# Patient Record
Sex: FEMALE | Race: WHITE | NOT HISPANIC OR LATINO | URBAN - METROPOLITAN AREA
[De-identification: names, ages, dates, MRNs, and addresses within clinical notes are randomized per-mention and may not be internally consistent; named-entity substitution may affect disease eponyms.]

---

## 2017-05-16 ENCOUNTER — OUTPATIENT (OUTPATIENT)
Dept: OUTPATIENT SERVICES | Facility: HOSPITAL | Age: 62
LOS: 1 days | Discharge: HOME | End: 2017-05-16

## 2017-05-16 ENCOUNTER — APPOINTMENT (OUTPATIENT)
Dept: HEMATOLOGY ONCOLOGY | Facility: CLINIC | Age: 62
End: 2017-05-16

## 2017-05-16 VITALS
TEMPERATURE: 96.2 F | HEIGHT: 65 IN | WEIGHT: 119 LBS | DIASTOLIC BLOOD PRESSURE: 59 MMHG | HEART RATE: 77 BPM | SYSTOLIC BLOOD PRESSURE: 116 MMHG | BODY MASS INDEX: 19.83 KG/M2 | RESPIRATION RATE: 14 BRPM

## 2017-06-28 DIAGNOSIS — C85.93 NON-HODGKIN LYMPHOMA, UNSPECIFIED, INTRA-ABDOMINAL LYMPH NODES: ICD-10-CM

## 2018-05-15 ENCOUNTER — APPOINTMENT (OUTPATIENT)
Dept: HEMATOLOGY ONCOLOGY | Facility: CLINIC | Age: 63
End: 2018-05-15

## 2018-05-15 ENCOUNTER — OUTPATIENT (OUTPATIENT)
Dept: OUTPATIENT SERVICES | Facility: HOSPITAL | Age: 63
LOS: 1 days | Discharge: HOME | End: 2018-05-15

## 2018-05-15 VITALS
HEART RATE: 80 BPM | SYSTOLIC BLOOD PRESSURE: 111 MMHG | WEIGHT: 120 LBS | BODY MASS INDEX: 19.99 KG/M2 | DIASTOLIC BLOOD PRESSURE: 71 MMHG | HEIGHT: 65 IN | TEMPERATURE: 97.1 F

## 2018-05-21 DIAGNOSIS — C85.93 NON-HODGKIN LYMPHOMA, UNSPECIFIED, INTRA-ABDOMINAL LYMPH NODES: ICD-10-CM

## 2019-05-14 ENCOUNTER — OUTPATIENT (OUTPATIENT)
Dept: OUTPATIENT SERVICES | Facility: HOSPITAL | Age: 64
LOS: 1 days | Discharge: HOME | End: 2019-05-14

## 2019-05-14 ENCOUNTER — APPOINTMENT (OUTPATIENT)
Dept: HEMATOLOGY ONCOLOGY | Facility: CLINIC | Age: 64
End: 2019-05-14

## 2019-05-14 VITALS
RESPIRATION RATE: 14 BRPM | HEIGHT: 65 IN | TEMPERATURE: 96.4 F | BODY MASS INDEX: 19.49 KG/M2 | WEIGHT: 117 LBS | DIASTOLIC BLOOD PRESSURE: 81 MMHG | HEART RATE: 84 BPM | SYSTOLIC BLOOD PRESSURE: 137 MMHG

## 2019-05-14 NOTE — HISTORY OF PRESENT ILLNESS
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  She was treated with RCHOP in 2010.\par \par She is up to date with colonoscopy 2016 and had a mammogram 6/1/18 and Pap smear was done last July 2018 [de-identified] : Patient offers no new complaints today.\par She states she had a rough year since her  .\par No fever, wt loss, \par Appetite is fair.\par She still exercises regularly

## 2019-05-14 NOTE — REASON FOR VISIT
[Follow-Up Visit] : a follow-up [FreeTextEntry2] : history of stage IV diffuse large B-cell lymphoma

## 2019-05-14 NOTE — ASSESSMENT
[FreeTextEntry1] : H/o DLBCL without any evidence of recurrence. \par \par She will follow here in one year\par Labs discussed.\par Pt advised about signs of recurrence.

## 2019-05-24 DIAGNOSIS — C85.93 NON-HODGKIN LYMPHOMA, UNSPECIFIED, INTRA-ABDOMINAL LYMPH NODES: ICD-10-CM

## 2020-06-16 ENCOUNTER — APPOINTMENT (OUTPATIENT)
Dept: HEMATOLOGY ONCOLOGY | Facility: CLINIC | Age: 65
End: 2020-06-16
Payer: MEDICARE

## 2020-06-16 ENCOUNTER — LABORATORY RESULT (OUTPATIENT)
Age: 65
End: 2020-06-16

## 2020-06-16 ENCOUNTER — OUTPATIENT (OUTPATIENT)
Dept: OUTPATIENT SERVICES | Facility: HOSPITAL | Age: 65
LOS: 1 days | Discharge: HOME | End: 2020-06-16

## 2020-06-16 VITALS
TEMPERATURE: 96.8 F | HEART RATE: 87 BPM | SYSTOLIC BLOOD PRESSURE: 133 MMHG | HEIGHT: 65 IN | WEIGHT: 118 LBS | DIASTOLIC BLOOD PRESSURE: 67 MMHG | BODY MASS INDEX: 19.66 KG/M2

## 2020-06-16 LAB
ALBUMIN SERPL ELPH-MCNC: 4.8 G/DL
ALP BLD-CCNC: 75 U/L
ALT SERPL-CCNC: 37 U/L
ANION GAP SERPL CALC-SCNC: 14 MMOL/L
AST SERPL-CCNC: 32 U/L
BILIRUB SERPL-MCNC: 0.9 MG/DL
BUN SERPL-MCNC: 10 MG/DL
CALCIUM SERPL-MCNC: 9.9 MG/DL
CHLORIDE SERPL-SCNC: 103 MMOL/L
CO2 SERPL-SCNC: 26 MMOL/L
CREAT SERPL-MCNC: 0.8 MG/DL
GLUCOSE SERPL-MCNC: 83 MG/DL
HCT VFR BLD CALC: 43.9 %
HGB BLD-MCNC: 14.4 G/DL
LDH SERPL-CCNC: 258
MCHC RBC-ENTMCNC: 30.3 PG
MCHC RBC-ENTMCNC: 32.8 G/DL
MCV RBC AUTO: 92.2 FL
PLATELET # BLD AUTO: 235 K/UL
PMV BLD: 9.6 FL
POTASSIUM SERPL-SCNC: 4.7 MMOL/L
PROT SERPL-MCNC: 7.7 G/DL
RBC # BLD: 4.76 M/UL
RBC # FLD: 12.7 %
SODIUM SERPL-SCNC: 143 MMOL/L
WBC # FLD AUTO: 5.44 K/UL

## 2020-06-16 PROCEDURE — 99213 OFFICE O/P EST LOW 20 MIN: CPT

## 2020-06-16 NOTE — REVIEW OF SYSTEMS
[Negative] : Allergic/Immunologic [FreeTextEntry2] : Last mammogram 6/2019- next due 6/2020, coloscopy 4 years ago.

## 2020-06-16 NOTE — HISTORY OF PRESENT ILLNESS
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  She was treated with RCHOP in 2010.\par \par She is up to date with colonoscopy 2016 and had a mammogram 6/1/18 and Pap smear was done last July 2018 [de-identified] : Patient offers no new complaints today.\par She states she had a rough year since her  .\par No fever, wt loss, \par Appetite is fair.\par She still exercises regularly\par \par 2020\par EDUARDA PUGA a 65 year F is here today for annual follow up of stage IV stage diffuse large B cell lymphoma.\par Pt offers no new complaints. \par Denies fever, chills, SOB, weight loss, night sweats.\par

## 2020-06-16 NOTE — ASSESSMENT
[FreeTextEntry1] : H/o DLBCL without any evidence of recurrence. \par \par She will follow here in one year.\par Note provided for patient to give to condo board to restrict contractors or maintenance workers in her home at this time in light of COVID-19 and her immunocompromised status.  \par Labs ordered CBC, CMP, LDH\par F/up with PCP\par Pt advised about signs of recurrence.\par \par Case was seen and discussed with Dr. West who agreed with the assessment and plan.\par

## 2020-06-25 DIAGNOSIS — C85.10 UNSPECIFIED B-CELL LYMPHOMA, UNSPECIFIED SITE: ICD-10-CM

## 2021-07-14 ENCOUNTER — OUTPATIENT (OUTPATIENT)
Dept: OUTPATIENT SERVICES | Facility: HOSPITAL | Age: 66
LOS: 1 days | Discharge: HOME | End: 2021-07-14

## 2021-07-14 ENCOUNTER — LABORATORY RESULT (OUTPATIENT)
Age: 66
End: 2021-07-14

## 2021-07-14 ENCOUNTER — APPOINTMENT (OUTPATIENT)
Dept: HEMATOLOGY ONCOLOGY | Facility: CLINIC | Age: 66
End: 2021-07-14
Payer: MEDICARE

## 2021-07-14 LAB
ALBUMIN SERPL ELPH-MCNC: 4.6 G/DL
ALP BLD-CCNC: 80 U/L
ALT SERPL-CCNC: 43 U/L
ANION GAP SERPL CALC-SCNC: 13 MMOL/L
AST SERPL-CCNC: 43 U/L
BILIRUB SERPL-MCNC: 0.8 MG/DL
BUN SERPL-MCNC: 14 MG/DL
CALCIUM SERPL-MCNC: 9.5 MG/DL
CHLORIDE SERPL-SCNC: 104 MMOL/L
CO2 SERPL-SCNC: 23 MMOL/L
CREAT SERPL-MCNC: 0.8 MG/DL
GLUCOSE SERPL-MCNC: 72 MG/DL
HCT VFR BLD CALC: 43 %
HGB BLD-MCNC: 14 G/DL
LDH SERPL-CCNC: 332
MCHC RBC-ENTMCNC: 30.1 PG
MCHC RBC-ENTMCNC: 32.6 G/DL
MCV RBC AUTO: 92.5 FL
PLATELET # BLD AUTO: 243 K/UL
PMV BLD: 10.5 FL
POTASSIUM SERPL-SCNC: 4.7 MMOL/L
PROT SERPL-MCNC: 7.2 G/DL
RBC # BLD: 4.65 M/UL
RBC # FLD: 13.2 %
SODIUM SERPL-SCNC: 140 MMOL/L
WBC # FLD AUTO: 5.96 K/UL

## 2021-07-14 PROCEDURE — 99213 OFFICE O/P EST LOW 20 MIN: CPT

## 2021-07-14 NOTE — HISTORY OF PRESENT ILLNESS
[de-identified] : Patient offers no new complaints today.\par She states she had a rough year since her  .\par No fever, wt loss, \par Appetite is fair.\par She still exercises regularly\par \par 2020\par EDUARDA PUGA a 65 year F is here today for annual follow up of stage IV stage diffuse large B cell lymphoma.\par Pt offers no new complaints. \par Denies fever, chills, SOB, weight loss, night sweats.\par \par 2021\par Patient is here to follow for lymphoma\par She feels well today, denies any fever, chills, night sweats, weight loss or lymphadenopathy\par She received COVID vaccine x 2\par She is UTD with her PCP, ammo and colonoscopy\par She has not been with her gyne for a few years now\par \par   [de-identified] : This is a 63 year old white female who is here for follow up for her history of stage IV diffuse large B-cell lymphoma diagnosed in May 2010.  She was treated with RCHOP in 2010.\par \par She is up to date with colonoscopy 2016 and had a mammogram 6/1/18 and Pap smear was done last July 2018

## 2021-07-14 NOTE — ASSESSMENT
[FreeTextEntry1] : H/o DLBCL without any evidence of recurrence. \par \par CBC on 7/12/2021 reviewed and normal. Results discussed with patient. \par Signs and symptoms of disease recurrence discussed.\par Will do CMP and LDH today. Will call patient with the results.\par F/up with PCP, gyne and GI as recommended\par \par RTC in 1 year\par \par Case was seen and discussed with Dr. West who agreed with the assessment and plan.\par

## 2021-07-14 NOTE — REVIEW OF SYSTEMS
[Negative] : Allergic/Immunologic [Joint Pain] : joint pain [FreeTextEntry9] : intermittent right knee pain

## 2021-07-15 DIAGNOSIS — C85.10 UNSPECIFIED B-CELL LYMPHOMA, UNSPECIFIED SITE: ICD-10-CM

## 2022-06-06 NOTE — END OF VISIT
[FreeTextEntry3] : I was physically present for the key portions of the evaluation and management service provided.  I agree with the history and physical, and plan which I have reviewed and edited where appropriate.\par 
no

## 2022-07-05 ENCOUNTER — OUTPATIENT (OUTPATIENT)
Dept: OUTPATIENT SERVICES | Facility: HOSPITAL | Age: 67
LOS: 1 days | Discharge: HOME | End: 2022-07-05

## 2022-07-05 ENCOUNTER — LABORATORY RESULT (OUTPATIENT)
Age: 67
End: 2022-07-05

## 2022-07-05 ENCOUNTER — APPOINTMENT (OUTPATIENT)
Dept: HEMATOLOGY ONCOLOGY | Facility: CLINIC | Age: 67
End: 2022-07-05

## 2022-07-05 VITALS
HEIGHT: 65 IN | TEMPERATURE: 98.2 F | HEART RATE: 84 BPM | BODY MASS INDEX: 19.66 KG/M2 | SYSTOLIC BLOOD PRESSURE: 121 MMHG | WEIGHT: 118 LBS | DIASTOLIC BLOOD PRESSURE: 80 MMHG

## 2022-07-05 LAB
HCT VFR BLD CALC: 43.2 %
HGB BLD-MCNC: 14.5 G/DL
MCHC RBC-ENTMCNC: 30.7 PG
MCHC RBC-ENTMCNC: 33.6 G/DL
MCV RBC AUTO: 91.3 FL
PLATELET # BLD AUTO: 302 K/UL
PMV BLD: 9.7 FL
RBC # BLD: 4.73 M/UL
RBC # FLD: 12.8 %
WBC # FLD AUTO: 5.46 K/UL

## 2022-07-05 PROCEDURE — 99213 OFFICE O/P EST LOW 20 MIN: CPT

## 2022-07-05 NOTE — ASSESSMENT
[FreeTextEntry1] : Patient is a 67 year old female with h/o DLBCL without any evidence of recurrence. \par \par CBC today is normal.\par \par RECOMMENDATION:\par Previous notes reviewed including relevant laboratory results and were communicated to the patient.\par \par Signs and symptoms of disease recurrence discussed.\par Will do CMP and LDH today. Will call patient with the results.\par Follow up with PCP as scheduled. Emphasized to see gyne and GI for health maintenance and screenings.\par \par RTC in 1 year\par \par \par

## 2022-07-05 NOTE — REVIEW OF SYSTEMS
[Negative] : Allergic/Immunologic [Joint Pain] : no joint pain [FreeTextEntry9] : intermittent low back pain when lifting heavy objects

## 2022-07-05 NOTE — HISTORY OF PRESENT ILLNESS
[de-identified] : Patient offers no new complaints today.\par She states she had a rough year since her  .\par No fever, wt loss, \par Appetite is fair.\par She still exercises regularly\par \par 2020\par EDUARDA PUGA a 65 year F is here today for annual follow up of stage IV stage diffuse large B cell lymphoma.\par Pt offers no new complaints. \par Denies fever, chills, SOB, weight loss, night sweats.\par \par 2021\par Patient is here to follow for lymphoma\par She feels well today, denies any fever, chills, night sweats, weight loss or lymphadenopathy\par She received COVID vaccine x 2\par She is UTD with her PCP, ammo and colonoscopy\par She has not been with her gyne for a few years now\par \par  2022\par Patient is here to follow for lymphoma.\par She feels well today, denies any fever, chills, night sweats, weight loss or lymphadenopathy.\par She is UTD with her PCP, does not see gyne x few years now and last colonoscopy was a few years ago and was benign as per patient.\par  [de-identified] : This is a 63 year old white female who is here for follow up for her history of stage IV diffuse large B-cell lymphoma diagnosed in May 2010.  She was treated with RCHOP in 2010.\par \par She is up to date with colonoscopy 2016 and had a mammogram 6/1/18 and Pap smear was done last July 2018

## 2022-07-06 LAB
ALBUMIN SERPL ELPH-MCNC: 4.6 G/DL
ALP BLD-CCNC: 71 U/L
ALT SERPL-CCNC: 46 U/L
ANION GAP SERPL CALC-SCNC: 15 MMOL/L
AST SERPL-CCNC: 45 U/L
BILIRUB SERPL-MCNC: 0.6 MG/DL
BUN SERPL-MCNC: 14 MG/DL
CALCIUM SERPL-MCNC: 9.7 MG/DL
CHLORIDE SERPL-SCNC: 103 MMOL/L
CO2 SERPL-SCNC: 25 MMOL/L
CREAT SERPL-MCNC: 0.9 MG/DL
EGFR: 70 ML/MIN/1.73M2
GLUCOSE SERPL-MCNC: 85 MG/DL
LDH SERPL-CCNC: 284
POTASSIUM SERPL-SCNC: 4.9 MMOL/L
PROT SERPL-MCNC: 7.3 G/DL
SODIUM SERPL-SCNC: 143 MMOL/L

## 2022-07-11 DIAGNOSIS — C85.10 UNSPECIFIED B-CELL LYMPHOMA, UNSPECIFIED SITE: ICD-10-CM

## 2023-08-03 ENCOUNTER — OUTPATIENT (OUTPATIENT)
Dept: OUTPATIENT SERVICES | Facility: HOSPITAL | Age: 68
LOS: 1 days | End: 2023-08-03
Payer: MEDICARE

## 2023-08-03 ENCOUNTER — APPOINTMENT (OUTPATIENT)
Dept: HEMATOLOGY ONCOLOGY | Facility: CLINIC | Age: 68
End: 2023-08-03
Payer: MEDICARE

## 2023-08-03 ENCOUNTER — LABORATORY RESULT (OUTPATIENT)
Age: 68
End: 2023-08-03

## 2023-08-03 VITALS
HEIGHT: 65 IN | DIASTOLIC BLOOD PRESSURE: 82 MMHG | SYSTOLIC BLOOD PRESSURE: 134 MMHG | TEMPERATURE: 98.1 F | WEIGHT: 113 LBS | BODY MASS INDEX: 18.83 KG/M2 | HEART RATE: 78 BPM

## 2023-08-03 DIAGNOSIS — C85.10 UNSPECIFIED B-CELL LYMPHOMA, UNSPECIFIED SITE: ICD-10-CM

## 2023-08-03 LAB
ALBUMIN SERPL ELPH-MCNC: 4.7 G/DL
ALP BLD-CCNC: 65 U/L
ALT SERPL-CCNC: 37 U/L
ANION GAP SERPL CALC-SCNC: 14 MMOL/L
AST SERPL-CCNC: 31 U/L
BILIRUB SERPL-MCNC: 0.8 MG/DL
BUN SERPL-MCNC: 12 MG/DL
CALCIUM SERPL-MCNC: 9.6 MG/DL
CHLORIDE SERPL-SCNC: 102 MMOL/L
CO2 SERPL-SCNC: 24 MMOL/L
CREAT SERPL-MCNC: 0.7 MG/DL
EGFR: 94 ML/MIN/1.73M2
GLUCOSE SERPL-MCNC: 82 MG/DL
HCT VFR BLD CALC: 44 %
HGB BLD-MCNC: 13.8 G/DL
LDH SERPL-CCNC: 309
MCHC RBC-ENTMCNC: 29.5 PG
MCHC RBC-ENTMCNC: 31.4 G/DL
MCV RBC AUTO: 94 FL
PLATELET # BLD AUTO: 190 K/UL
PMV BLD: 11.5 FL
POTASSIUM SERPL-SCNC: 4.8 MMOL/L
PROT SERPL-MCNC: 7.2 G/DL
RBC # BLD: 4.68 M/UL
RBC # FLD: 13.2 %
SODIUM SERPL-SCNC: 140 MMOL/L
WBC # FLD AUTO: 4.49 K/UL

## 2023-08-03 PROCEDURE — 99213 OFFICE O/P EST LOW 20 MIN: CPT

## 2023-08-03 PROCEDURE — 80053 COMPREHEN METABOLIC PANEL: CPT

## 2023-08-03 PROCEDURE — 83615 LACTATE (LD) (LDH) ENZYME: CPT

## 2023-08-03 PROCEDURE — 85027 COMPLETE CBC AUTOMATED: CPT

## 2023-08-04 DIAGNOSIS — C85.10 UNSPECIFIED B-CELL LYMPHOMA, UNSPECIFIED SITE: ICD-10-CM

## 2023-08-04 NOTE — HISTORY OF PRESENT ILLNESS
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  She was treated with RCHOP in 2010.\par  \par  She is up to date with colonoscopy 2016 and had a mammogram 6/1/18 and Pap smear was done last July 2018 [de-identified] : Patient offers no new complaints today. She states she had a rough year since her  . No fever, wt loss,  Appetite is fair. She still exercises regularly  2020 EDUARDA PUGA a 65 year F is here today for annual follow up of stage IV stage diffuse large B cell lymphoma. Pt offers no new complaints.  Denies fever, chills, SOB, weight loss, night sweats.  2021 Patient is here to follow for lymphoma She feels well today, denies any fever, chills, night sweats, weight loss or lymphadenopathy She received COVID vaccine x 2 She is UTD with her PCP, ammo and colonoscopy She has not been with her gyne for a few years now   2022 Patient is here to follow for lymphoma. She feels well today, denies any fever, chills, night sweats, weight loss or lymphadenopathy. She is UTD with her PCP, does not see gyne x few years now and last colonoscopy was a few years ago and was benign as per patient.  8/3/23 Patient is here to follow for lymphoma. She feels well today, denies any fever, chills, night sweats, weight loss or lymphadenopathy. She is UTD with her PCP, does not see gyne x few years now and last colonoscopy was a few years ago and was benign as per patient.  8/3/23 Patient is here to follow for lymphoma. She feels well today, denies any fever, chills, night sweats, weight loss or lymphadenopathy. She is UTD with her PCP, does not see gyne x few years now, last colonoscopy was a few years ago, UTD with mammogram.

## 2023-08-04 NOTE — END OF VISIT
[FreeTextEntry3] : I was physically present for the key portions of the evaluation and management service provided. I agree with the history and physical, and plan which I have reviewed and edited where appropriate.

## 2023-08-04 NOTE — ASSESSMENT
[FreeTextEntry1] : Patient is a 68 year old female with h/o DLBCL without any evidence of recurrence.   CBC today showed mild leucopenia.  RECOMMENDATION: Previous notes reviewed including relevant laboratory results discussed with Dr West and were communicated to the patient.  Signs and symptoms of disease recurrence discussed. Will do CMP and LDH today. Will call patient with the results. Follow up with PCP as scheduled. Emphasized to see gyne and GI for health maintenance and screenings.  RTC in 1 year  Case was seen and discussed with Dr. West who agreed with assessment and plan.

## 2024-08-01 ENCOUNTER — OUTPATIENT (OUTPATIENT)
Dept: OUTPATIENT SERVICES | Facility: HOSPITAL | Age: 69
LOS: 1 days | End: 2024-08-01
Payer: MEDICARE

## 2024-08-01 ENCOUNTER — LABORATORY RESULT (OUTPATIENT)
Age: 69
End: 2024-08-01

## 2024-08-01 ENCOUNTER — APPOINTMENT (OUTPATIENT)
Age: 69
End: 2024-08-01
Payer: MEDICARE

## 2024-08-01 VITALS
OXYGEN SATURATION: 100 % | TEMPERATURE: 98 F | BODY MASS INDEX: 19.15 KG/M2 | SYSTOLIC BLOOD PRESSURE: 123 MMHG | HEIGHT: 63.5 IN | DIASTOLIC BLOOD PRESSURE: 84 MMHG | HEART RATE: 90 BPM | WEIGHT: 109.44 LBS

## 2024-08-01 DIAGNOSIS — C85.10 UNSPECIFIED B-CELL LYMPHOMA, UNSPECIFIED SITE: ICD-10-CM

## 2024-08-01 LAB
ALBUMIN SERPL ELPH-MCNC: 4.6 G/DL
ALP BLD-CCNC: 66 U/L
ALT SERPL-CCNC: 29 U/L
ANION GAP SERPL CALC-SCNC: 14 MMOL/L
AST SERPL-CCNC: 26 U/L
BILIRUB SERPL-MCNC: 0.5 MG/DL
BUN SERPL-MCNC: 15 MG/DL
CALCIUM SERPL-MCNC: 9.4 MG/DL
CHLORIDE SERPL-SCNC: 102 MMOL/L
CO2 SERPL-SCNC: 23 MMOL/L
CREAT SERPL-MCNC: 0.8 MG/DL
EGFR: 80 ML/MIN/1.73M2
GLUCOSE SERPL-MCNC: 84 MG/DL
HCT VFR BLD CALC: 40.9 %
HGB BLD-MCNC: 13.7 G/DL
LDH SERPL-CCNC: 283
MCHC RBC-ENTMCNC: 30.1 PG
MCHC RBC-ENTMCNC: 33.5 G/DL
MCV RBC AUTO: 89.9 FL
PLATELET # BLD AUTO: 200 K/UL
PMV BLD: 10.1 FL
POTASSIUM SERPL-SCNC: 4.9 MMOL/L
PROT SERPL-MCNC: 7.1 G/DL
RBC # BLD: 4.55 M/UL
RBC # FLD: 12.5 %
SODIUM SERPL-SCNC: 139 MMOL/L
WBC # FLD AUTO: 4.92 K/UL

## 2024-08-01 PROCEDURE — 99213 OFFICE O/P EST LOW 20 MIN: CPT

## 2024-08-01 PROCEDURE — 85027 COMPLETE CBC AUTOMATED: CPT

## 2024-08-01 PROCEDURE — 83615 LACTATE (LD) (LDH) ENZYME: CPT

## 2024-08-01 PROCEDURE — 80053 COMPREHEN METABOLIC PANEL: CPT

## 2024-08-01 NOTE — HISTORY OF PRESENT ILLNESS
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  She was treated with RCHOP in 2010.\par  \par  She is up to date with colonoscopy 2016 and had a mammogram 6/1/18 and Pap smear was done last July 2018 [de-identified] : Patient offers no new complaints today. She states she had a rough year since her  . No fever, wt loss,  Appetite is fair. She still exercises regularly  2020 EDUARDA PUGA a 65 year F is here today for annual follow up of stage IV stage diffuse large B cell lymphoma. Pt offers no new complaints.  Denies fever, chills, SOB, weight loss, night sweats.  2021 Patient is here to follow for lymphoma She feels well today, denies any fever, chills, night sweats, weight loss or lymphadenopathy She received COVID vaccine x 2 She is UTD with her PCP, ammo and colonoscopy She has not been with her gyne for a few years now   2022 Patient is here to follow for lymphoma. She feels well today, denies any fever, chills, night sweats, weight loss or lymphadenopathy. She is UTD with her PCP, does not see gyne x few years now and last colonoscopy was a few years ago and was benign as per patient.  8/3/23 Patient is here to follow for lymphoma. She feels well today, denies any fever, chills, night sweats, weight loss or lymphadenopathy. She is UTD with her PCP, does not see gyne x few years now and last colonoscopy was a few years ago and was benign as per patient.  8/3/23 Patient is here to follow for lymphoma. She feels well today, denies any fever, chills, night sweats, weight loss or lymphadenopathy. She is UTD with her PCP, does not see gyne x few years now, last colonoscopy was a few years ago, UTD with mammogram.  24 Patient is here for her annual follow up for h/o lymphoma . She feels well today, denies any fever, chills, night sweats, weight loss or lymphadenopathy. She offers no new complaints. She is UTD with her PCP, does not see gyne x few years now, last colonoscopy , Last mammogram 17- no mammographic evidence of mailignancy.  Next appt- next week.

## 2024-08-01 NOTE — REASON FOR VISIT
[Follow-Up Visit] : a follow-up [Friend] : friend [FreeTextEntry2] : History of stage IV diffuse large B-cell lymphoma

## 2024-08-01 NOTE — ASSESSMENT
[FreeTextEntry1] : Patient is a 68 year old female with h/o DLBCL without any evidence of recurrence.   RECOMMENDATION: Previous notes reviewed including relevant laboratory results discussed with Dr West and were communicated to the patient.  Signs and symptoms of disease recurrence discussed. Blood work done today.  CBC normal. CMP and LDH pending results.  Will call patient with the results. Follow up with PCP as scheduled. Emphasized to see gyne and GI for health maintenance and screenings.  RTC in 1 year

## 2024-08-02 DIAGNOSIS — C85.10 UNSPECIFIED B-CELL LYMPHOMA, UNSPECIFIED SITE: ICD-10-CM

## 2025-06-11 PROBLEM — Z12.39 BREAST SCREENING: Status: ACTIVE | Noted: 2025-06-11

## 2025-06-16 ENCOUNTER — APPOINTMENT (OUTPATIENT)
Dept: OBGYN | Facility: CLINIC | Age: 70
End: 2025-06-16
Payer: MEDICARE

## 2025-06-16 ENCOUNTER — NON-APPOINTMENT (OUTPATIENT)
Age: 70
End: 2025-06-16

## 2025-06-16 ENCOUNTER — LABORATORY RESULT (OUTPATIENT)
Age: 70
End: 2025-06-16

## 2025-06-16 VITALS
DIASTOLIC BLOOD PRESSURE: 82 MMHG | HEIGHT: 64 IN | HEART RATE: 84 BPM | BODY MASS INDEX: 18.78 KG/M2 | WEIGHT: 110 LBS | SYSTOLIC BLOOD PRESSURE: 124 MMHG

## 2025-06-16 PROBLEM — R31.29 HEMATURIA, MICROSCOPIC: Status: ACTIVE | Noted: 2025-06-16

## 2025-06-16 PROBLEM — Z01.419 ENCOUNTER FOR GYNECOLOGICAL EXAMINATION WITHOUT ABNORMAL FINDING: Status: ACTIVE | Noted: 2025-06-16 | Resolved: 2025-06-30

## 2025-06-16 PROBLEM — Z87.891 FORMER SMOKER: Status: ACTIVE | Noted: 2025-06-16

## 2025-06-16 PROCEDURE — 99387 INIT PM E/M NEW PAT 65+ YRS: CPT

## 2025-06-16 PROCEDURE — 81003 URINALYSIS AUTO W/O SCOPE: CPT | Mod: QW

## 2025-06-16 PROCEDURE — 99459 PELVIC EXAMINATION: CPT

## 2025-06-20 LAB
APPEARANCE: CLEAR
BACTERIA UR CULT: NORMAL
BILIRUBIN URINE: NEGATIVE
BLOOD URINE: ABNORMAL
COLOR: YELLOW
GLUCOSE QUALITATIVE U: NEGATIVE MG/DL
KETONES URINE: ABNORMAL MG/DL
LEUKOCYTE ESTERASE URINE: ABNORMAL
NITRITE URINE: NEGATIVE
PH URINE: 6.5
PROTEIN URINE: NORMAL MG/DL
SPECIFIC GRAVITY URINE: 1.02
UROBILINOGEN URINE: 0.2 MG/DL

## 2025-08-07 ENCOUNTER — APPOINTMENT (OUTPATIENT)
Age: 70
End: 2025-08-07
Payer: MEDICARE

## 2025-08-07 VITALS
OXYGEN SATURATION: 100 % | HEIGHT: 64 IN | WEIGHT: 108 LBS | SYSTOLIC BLOOD PRESSURE: 143 MMHG | DIASTOLIC BLOOD PRESSURE: 85 MMHG | RESPIRATION RATE: 14 BRPM | TEMPERATURE: 97.5 F | HEART RATE: 76 BPM | BODY MASS INDEX: 18.44 KG/M2

## 2025-08-07 DIAGNOSIS — C85.10 UNSPECIFIED B-CELL LYMPHOMA, UNSPECIFIED SITE: ICD-10-CM

## 2025-08-07 LAB
AUTO BASOPHILS #: 0.04 K/UL
AUTO BASOPHILS %: 0.8 %
AUTO EOSINOPHILS #: 0.26 K/UL
AUTO EOSINOPHILS %: 5.3 %
AUTO IMMATURE GRANULOCYTES #: 0.01 K/UL
AUTO LYMPHOCYTES #: 1.75 K/UL
AUTO LYMPHOCYTES %: 35.6 %
AUTO MONOCYTES #: 0.32 K/UL
AUTO MONOCYTES %: 6.5 %
AUTO NEUTROPHILS #: 2.54 K/UL
AUTO NEUTROPHILS %: 51.6 %
AUTO NRBC #: 0 K/UL
HCT VFR BLD CALC: 41.7 %
HGB BLD-MCNC: 13.6 G/DL
IMM GRANULOCYTES NFR BLD AUTO: 0.2 %
MAN DIFF?: NORMAL
MCHC RBC-ENTMCNC: 29.8 PG
MCHC RBC-ENTMCNC: 32.6 G/DL
MCV RBC AUTO: 91.2 FL
PLATELET # BLD AUTO: 309 K/UL
PMV BLD AUTO: 0 /100 WBCS
PMV BLD: 9.5 FL
RBC # BLD: 4.57 M/UL
RBC # FLD: 13 %
WBC # FLD AUTO: 4.92 K/UL

## 2025-08-07 PROCEDURE — 99213 OFFICE O/P EST LOW 20 MIN: CPT

## 2025-08-08 LAB
ALBUMIN SERPL ELPH-MCNC: 4.4 G/DL
ALP BLD-CCNC: 70 U/L
ALT SERPL-CCNC: 30 U/L
ANION GAP SERPL CALC-SCNC: 13 MMOL/L
AST SERPL-CCNC: 28 U/L
BILIRUB SERPL-MCNC: 0.9 MG/DL
BUN SERPL-MCNC: 14 MG/DL
CALCIUM SERPL-MCNC: 9.4 MG/DL
CHLORIDE SERPL-SCNC: 104 MMOL/L
CO2 SERPL-SCNC: 24 MMOL/L
CREAT SERPL-MCNC: 0.7 MG/DL
EGFRCR SERPLBLD CKD-EPI 2021: 93 ML/MIN/1.73M2
GLUCOSE SERPL-MCNC: 80 MG/DL
LDH SERPL-CCNC: 250
POTASSIUM SERPL-SCNC: 4.9 MMOL/L
PROT SERPL-MCNC: 7.2 G/DL
SODIUM SERPL-SCNC: 141 MMOL/L